# Patient Record
Sex: FEMALE | Race: AMERICAN INDIAN OR ALASKA NATIVE
[De-identification: names, ages, dates, MRNs, and addresses within clinical notes are randomized per-mention and may not be internally consistent; named-entity substitution may affect disease eponyms.]

---

## 2018-04-05 ENCOUNTER — HOSPITAL ENCOUNTER (EMERGENCY)
Dept: HOSPITAL 5 - ED | Age: 38
Discharge: HOME | End: 2018-04-05
Payer: SELF-PAY

## 2018-04-05 VITALS — SYSTOLIC BLOOD PRESSURE: 123 MMHG | DIASTOLIC BLOOD PRESSURE: 83 MMHG
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
## 2018-04-05 DIAGNOSIS — Z87.891: ICD-10-CM
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
## 2018-04-05 DIAGNOSIS — T16.2XXA: Primary | ICD-10-CM

## 2018-04-05 PROCEDURE — 99282 EMERGENCY DEPT VISIT SF MDM: CPT

## 2018-04-05 NOTE — EMERGENCY DEPARTMENT REPORT
- General


Chief complaint: Skin/Abscess/Foreign Body


Stated complaint: EAR RING BACK STUCK IN EAR


Time Seen by Provider: 04/05/18 20:29


Source: patient


Mode of arrival: Ambulatory


Limitations: No Limitations





- History of Present Illness


Initial comments: 





This is a 37-year-old female that presents with a foreign body toward the left 

ear.  She stated that her right back of her earring which was circular and 

rubbery fells that her ear.  Patient denies any hearing abnormalities.  The 

patient denies any vertigo.  Denies any fever, chills, nausea, vomiting, 

headache or stiff neck.  Patient denies any allergies or significant past 

medical history.


MD complaint: foreign body


-: This morning


Severity: mild


Severity scale (0 -10): 8


Quality: aching


Consistency: constant


Improves with: none


Worsens with: none


Associated symptoms: denies other symptoms


Treatments Prior to Arrival: none





- Related Data


 Previous Rx's











 Medication  Instructions  Recorded  Last Taken  Type


 


Cipro/Dexameth 0.3/0.1% [Ciprodex 4 drops OT BID #1 bottle 04/05/18 Unknown Rx





OTIC]    











 Allergies











Allergy/AdvReac Type Severity Reaction Status Date / Time


 


No Known Allergies Allergy   Unverified 04/05/18 18:30














Abscess Boil HPI





- HPI


Chief Complaint: Skin/Abscess/Foreign Body


Stated Complaint: EAR RING BACK STUCK IN EAR


Time Seen by Provider: 04/05/18 20:29


Home Medications: 


 Previous Rx's











 Medication  Instructions  Recorded  Last Taken  Type


 


Cipro/Dexameth 0.3/0.1% [Ciprodex 4 drops OT BID #1 bottle 04/05/18 Unknown Rx





OTIC]    











Allergies/Adverse Reactions: 


 Allergies











Allergy/AdvReac Type Severity Reaction Status Date / Time


 


No Known Allergies Allergy   Unverified 04/05/18 18:30














ED Review of Systems


ROS: 


Stated complaint: EAR RING BACK STUCK IN EAR


Other details as noted in HPI





Constitutional: denies: chills, fever


Eyes: denies: eye pain, eye discharge, vision change


ENT: denies: ear pain, throat pain


Respiratory: denies: cough, shortness of breath, wheezing


Cardiovascular: denies: chest pain, palpitations


Endocrine: no symptoms reported


Gastrointestinal: denies: abdominal pain, nausea, diarrhea


Genitourinary: denies: urgency, dysuria, discharge


Musculoskeletal: denies: back pain, joint swelling, arthralgia


Skin: denies: rash, lesions


Neurological: denies: headache, weakness, paresthesias


Psychiatric: denies: anxiety, depression


Hematological/Lymphatic: denies: easy bleeding, easy bruising





ED Past Medical Hx





- Past Medical History


Previous Medical History?: No





- Surgical History


Past Surgical History?: No





- Social History


Smoking Status: Former Smoker


Substance Use Type: Alcohol





- Medications


Home Medications: 


 Home Medications











 Medication  Instructions  Recorded  Confirmed  Last Taken  Type


 


Cipro/Dexameth 0.3/0.1% [Ciprodex 4 drops OT BID #1 bottle 04/05/18  Unknown Rx





OTIC]     














ED Physical Exam





- General


Limitations: No Limitations


General appearance: alert, in no apparent distress





- Head


Head exam: Present: atraumatic, normocephalic





- Eye


Eye exam: Present: normal appearance


Pupils: Present: normal accommodation





- ENT


ENT exam: Present: mucous membranes moist





- Expanded ENT Exam


  ** Expanded


Ear exam: Present: normal external inspection


TM/Canal exam: Erythema: Left TM, Foreign Body: Left TM (circular rub )


Mouth exam: Present: normal external inspection, tongue normal.  Absent: 

drooling, trismus, muffled voice


Teeth exam: Present: normal inspection


Throat exam: Positive: normal inspection





- Neck


Neck exam: Present: normal inspection, full ROM





- Respiratory


Respiratory exam: Present: normal lung sounds bilaterally.  Absent: respiratory 

distress, wheezes, rales, rhonchi, stridor, chest wall tenderness, accessory 

muscle use, decreased breath sounds, prolonged expiratory





- Cardiovascular


Cardiovascular Exam: Present: regular rate, normal rhythm, normal heart sounds.

  Absent: irregular rhythm, systolic murmur, diastolic murmur, rubs, gallop





- GI/Abdominal


GI/Abdominal exam: Present: soft, normal bowel sounds





- Extremities Exam


Extremities exam: Present: normal inspection, full ROM, normal capillary refill





- Back Exam


Back exam: Present: normal inspection, full ROM





- Neurological Exam


Neurological exam: Present: alert, oriented X3





- Psychiatric


Psychiatric exam: Present: normal affect, normal mood





- Skin


Skin exam: Present: warm, dry, intact, normal color.  Absent: rash





ED Course





 Vital Signs











  04/05/18





  18:30


 


Temperature 98.8 F


 


Pulse Rate 78


 


Respiratory 18





Rate 


 


Blood Pressure 123/83


 


O2 Sat by Pulse 99





Oximetry 














- Reevaluation(s)


Reevaluation #1: 





04/05/18 20:41


Patient is speaking in full sentences with no signs of distress noted.





- Foreign Body Removal Ear


Location: ear canal (L)


Foreign Body Suspected: other (rub back of earring)


If Insect Suspected: no insect seen


Foreign Body Removed: yes


Foreign Body Removal Technique: forceps


Tympanic Membrane Intact: Yes


Patient Tolerated Procedure: well, no complications


Complications: none


Critical care attestation.: 


If time is entered above; I have spent that time in minutes in the direct care 

of this critically ill patient, excluding procedure time.








ED Disposition


Clinical Impression: 


Foreign body in left ear


Qualifiers:


 Encounter type: initial encounter Qualified Code(s): T16.2XXA - Foreign body 

in left ear, initial encounter





Disposition: DC-01 TO HOME OR SELFCARE


Is pt being admited?: No


Does the pt Need Aspirin: No


Condition: Stable


Instructions:  Ear Foreign Body (ED), Ciprofloxacin (Into the ear)


Additional Instructions: 


Follow-up with a primary care doctor in 3-5 days or if symptoms worsen and 

continue return to emergency room as soon as possible. 


Prescriptions: 


Cipro/Dexameth 0.3/0.1% [Ciprodex OTIC] 4 drops OT BID #1 bottle


Referrals: 


ANJEL HICKMAN MD [Primary Care Provider] - 3-5 Days


PRIMARY CARE,MD [Referring] - 3-5 Days


[facility] [Outside] - 3-5 Days


[facility] [Outside] - 3-5 Days


Forms:  Work/School Release Form(ED)